# Patient Record
Sex: MALE | Race: WHITE | NOT HISPANIC OR LATINO | URBAN - METROPOLITAN AREA
[De-identification: names, ages, dates, MRNs, and addresses within clinical notes are randomized per-mention and may not be internally consistent; named-entity substitution may affect disease eponyms.]

---

## 2017-06-13 ENCOUNTER — OUTPATIENT (OUTPATIENT)
Dept: OUTPATIENT SERVICES | Facility: HOSPITAL | Age: 77
LOS: 1 days | Discharge: HOME | End: 2017-06-13

## 2017-06-28 DIAGNOSIS — C61 MALIGNANT NEOPLASM OF PROSTATE: ICD-10-CM

## 2017-07-18 ENCOUNTER — OUTPATIENT (OUTPATIENT)
Dept: OUTPATIENT SERVICES | Facility: HOSPITAL | Age: 77
LOS: 1 days | Discharge: HOME | End: 2017-07-18

## 2017-07-18 DIAGNOSIS — R05 COUGH: ICD-10-CM

## 2017-07-18 DIAGNOSIS — M25.9 JOINT DISORDER, UNSPECIFIED: ICD-10-CM

## 2017-08-29 ENCOUNTER — EMERGENCY (EMERGENCY)
Facility: HOSPITAL | Age: 77
LOS: 0 days | Discharge: HOME | End: 2017-08-29

## 2017-08-29 ENCOUNTER — OUTPATIENT (OUTPATIENT)
Dept: OUTPATIENT SERVICES | Facility: HOSPITAL | Age: 77
LOS: 1 days | Discharge: HOME | End: 2017-08-29

## 2017-08-29 DIAGNOSIS — Z90.89 ACQUIRED ABSENCE OF OTHER ORGANS: ICD-10-CM

## 2017-08-29 DIAGNOSIS — M79.89 OTHER SPECIFIED SOFT TISSUE DISORDERS: ICD-10-CM

## 2017-08-29 DIAGNOSIS — Z90.49 ACQUIRED ABSENCE OF OTHER SPECIFIED PARTS OF DIGESTIVE TRACT: ICD-10-CM

## 2017-08-29 DIAGNOSIS — I82.890 ACUTE EMBOLISM AND THROMBOSIS OF OTHER SPECIFIED VEINS: ICD-10-CM

## 2017-08-29 DIAGNOSIS — Z88.2 ALLERGY STATUS TO SULFONAMIDES: ICD-10-CM

## 2017-08-29 DIAGNOSIS — Z98.890 OTHER SPECIFIED POSTPROCEDURAL STATES: ICD-10-CM

## 2017-08-29 DIAGNOSIS — Z79.899 OTHER LONG TERM (CURRENT) DRUG THERAPY: ICD-10-CM

## 2017-08-29 DIAGNOSIS — I10 ESSENTIAL (PRIMARY) HYPERTENSION: ICD-10-CM

## 2017-08-29 DIAGNOSIS — I82.432 ACUTE EMBOLISM AND THROMBOSIS OF LEFT POPLITEAL VEIN: ICD-10-CM

## 2017-08-29 DIAGNOSIS — Z79.82 LONG TERM (CURRENT) USE OF ASPIRIN: ICD-10-CM

## 2017-08-29 DIAGNOSIS — Z88.0 ALLERGY STATUS TO PENICILLIN: ICD-10-CM

## 2017-09-06 DIAGNOSIS — M79.89 OTHER SPECIFIED SOFT TISSUE DISORDERS: ICD-10-CM

## 2017-09-06 DIAGNOSIS — Z02.9 ENCOUNTER FOR ADMINISTRATIVE EXAMINATIONS, UNSPECIFIED: ICD-10-CM

## 2017-09-13 ENCOUNTER — APPOINTMENT (OUTPATIENT)
Dept: VASCULAR SURGERY | Facility: CLINIC | Age: 77
End: 2017-09-13

## 2018-07-09 ENCOUNTER — APPOINTMENT (OUTPATIENT)
Dept: VASCULAR SURGERY | Facility: CLINIC | Age: 78
End: 2018-07-09
Payer: MEDICARE

## 2018-07-09 PROCEDURE — 93880 EXTRACRANIAL BILAT STUDY: CPT

## 2019-06-07 ENCOUNTER — RECORD ABSTRACTING (OUTPATIENT)
Age: 79
End: 2019-06-07

## 2019-06-07 DIAGNOSIS — Z87.891 PERSONAL HISTORY OF NICOTINE DEPENDENCE: ICD-10-CM

## 2019-06-07 DIAGNOSIS — M17.12 UNILATERAL PRIMARY OSTEOARTHRITIS, LEFT KNEE: ICD-10-CM

## 2019-06-07 DIAGNOSIS — I10 ESSENTIAL (PRIMARY) HYPERTENSION: ICD-10-CM

## 2019-06-07 DIAGNOSIS — H35.30 UNSPECIFIED MACULAR DEGENERATION: ICD-10-CM

## 2019-06-07 DIAGNOSIS — Z81.8 FAMILY HISTORY OF OTHER MENTAL AND BEHAVIORAL DISORDERS: ICD-10-CM

## 2019-07-10 ENCOUNTER — APPOINTMENT (OUTPATIENT)
Dept: NEUROLOGY | Facility: CLINIC | Age: 79
End: 2019-07-10
Payer: MEDICARE

## 2019-07-10 VITALS
DIASTOLIC BLOOD PRESSURE: 69 MMHG | SYSTOLIC BLOOD PRESSURE: 119 MMHG | OXYGEN SATURATION: 99 % | WEIGHT: 206 LBS | HEIGHT: 71 IN | TEMPERATURE: 95.2 F | HEART RATE: 72 BPM | BODY MASS INDEX: 28.84 KG/M2

## 2019-07-10 PROCEDURE — 99214 OFFICE O/P EST MOD 30 MIN: CPT

## 2019-07-10 NOTE — HISTORY OF PRESENT ILLNESS
[FreeTextEntry1] : Surendra is here for follow up. No new complaint. He is quite happy about that he is stands he just celebrated his 79th birthday.\par He continues to have morning stiffness and back pain and he continues to be slightly compromised mechanically when he walks. This is a combination of multiple factors including the ankle and knee and also spine.\par He did not have any fall he has his regular followup CTs primary physician. He has a borderline high cholesterol and also vitamin D deficiency.\par His sleep pattern is overall good he keeps himself busy bleeding and being active.\par He says he feels refreshed and he has limited social interaction but overall is happy.\par He says his memory is also good.

## 2019-07-10 NOTE — PHYSICAL EXAM
[FreeTextEntry1] : Surendra is in a good mood follows first step commands us as the midline well. His attention concentration and executive behavior is normal.\par Cranial nerve exam is normal does have slight problem with his hearing.\par Rapid eye movements and fine motor movements are normal there is no tremor there is noted a lesion he is able to stand up without using his hands although it is difficult for him because of his mechanics. He does have kyphosis and mild scoliosis

## 2019-07-10 NOTE — ASSESSMENT
[FreeTextEntry1] : Surendra is here for followup he did the history of gait disorder. I have seen him but the question of Parkinson disease that in my opinion was not there. His gait issue appears to be mechanical and multi- factorial\par At this point we will continue the current level of care I have discussed with him seeing his orthopedic surgeon for his right knee and also to continue being careful about the economy and hopefully losing a few pounds and hydrotherapy

## 2019-08-09 ENCOUNTER — APPOINTMENT (OUTPATIENT)
Dept: ORTHOPEDIC SURGERY | Facility: CLINIC | Age: 79
End: 2019-08-09
Payer: MEDICARE

## 2019-08-09 PROCEDURE — 99214 OFFICE O/P EST MOD 30 MIN: CPT

## 2019-08-09 PROCEDURE — 73564 X-RAY EXAM KNEE 4 OR MORE: CPT | Mod: 50

## 2019-08-09 NOTE — PHYSICAL EXAM
[de-identified] : Radiographs from today AP lateral and skyline views of both knees shows a well aligned cemented PS TKA on the left and advanced lateral compartment arthritis in the right knee.  [de-identified] : Left Knee\par Inspection: no effusion\par Wounds: healed midline incision, no drainage or erythema\par Alignment: normal.\par Palpation: no specific tenderness on palpation.\par ROM: 0-110 degrees, good stability\par Muscle Test: good quad strength.\par Leg examination: calf is soft and non-tender.\par \par Right knee\par Inspection: no effusion or erythema.\par Wounds: none.\par Alignment: normal.\par Palpation: lateral joint line tenderness\par ROM: 0-100 degrees\par Ligamentous laxity: all ligaments appear \par Meniscal Test: n/a\par Muscle Test: good quad strength.\par Leg examination: calf is soft and non-tender.

## 2019-08-09 NOTE — HISTORY OF PRESENT ILLNESS
[de-identified] : 79 year old male s/p left total knee replacement in August 2017 presents to the office today for a follow up. Pt reports doing very well in regard to his left knee replacement and has increased functionality in his activities of daily living. However, patient reports increased pain in his right arthritic knee. He reports a pain that is dull and occasionally sharp in nature. He denies any swelling, buckling,and loss of motion. He does report clicking and locking. Pt reports being able to ambulate about 3-4 blocks before his right knee pain limits him. He states that negotiating stairs can be difficult and painful, he uses a chair lift often. Pt denies taking anything for pain at this time. Overall, patient is happy with his left TKA, but would like to discuss his options regarding his right knee arthritis.

## 2019-08-09 NOTE — REVIEW OF SYSTEMS
[Negative] : Heme/Lymph [Joint Pain] : joint pain [Joint Stiffness] : joint stiffness [FreeTextEntry9] : Right knee

## 2019-08-09 NOTE — ASSESSMENT
[FreeTextEntry1] : S/p LTK, 2017, complicated by DVT. He is doing very well, he is off medications for DVT. Having no significant pain in the knee, occasional right knee pain which is arthritic. He is walking 3-4 blocks and uses a chair lift for stairs.

## 2020-01-14 ENCOUNTER — APPOINTMENT (OUTPATIENT)
Dept: NEUROLOGY | Facility: CLINIC | Age: 80
End: 2020-01-14
Payer: MEDICARE

## 2020-01-14 VITALS
TEMPERATURE: 98.6 F | SYSTOLIC BLOOD PRESSURE: 115 MMHG | HEART RATE: 79 BPM | WEIGHT: 200 LBS | OXYGEN SATURATION: 97 % | HEIGHT: 71 IN | BODY MASS INDEX: 28 KG/M2 | DIASTOLIC BLOOD PRESSURE: 78 MMHG

## 2020-01-14 PROCEDURE — 99213 OFFICE O/P EST LOW 20 MIN: CPT

## 2020-01-14 RX ORDER — DUTASTERIDE AND TAMSULOSIN HYDROCHLORIDE .5; .4 MG/1; MG/1
0.5-0.4 CAPSULE ORAL
Refills: 0 | Status: ACTIVE | COMMUNITY

## 2020-01-14 RX ORDER — LATANOPROSTENE BUNOD 0.24 MG/ML
0.02 SOLUTION/ DROPS OPHTHALMIC
Refills: 0 | Status: ACTIVE | COMMUNITY

## 2020-01-14 RX ORDER — AMLODIPINE BESYLATE AND BENAZEPRIL HYDROCHLORIDE 5; 40 MG/1; MG/1
5-40 CAPSULE ORAL
Refills: 0 | Status: ACTIVE | COMMUNITY

## 2020-01-14 RX ORDER — CARVEDILOL 6.25 MG/1
6.25 TABLET, FILM COATED ORAL TWICE DAILY
Refills: 0 | Status: ACTIVE | COMMUNITY

## 2020-01-14 RX ORDER — ROSUVASTATIN CALCIUM 5 MG/1
5 TABLET, FILM COATED ORAL DAILY
Refills: 0 | Status: ACTIVE | COMMUNITY

## 2020-01-14 RX ORDER — ASPIRIN 81 MG
81 TABLET, DELAYED RELEASE (ENTERIC COATED) ORAL DAILY
Refills: 0 | Status: ACTIVE | COMMUNITY

## 2020-01-14 RX ORDER — TIMOLOL MALEATE 5 MG/ML
0.5 SOLUTION/ DROPS OPHTHALMIC
Refills: 0 | Status: ACTIVE | COMMUNITY

## 2020-01-14 RX ORDER — NETARSUDIL 0.2 MG/ML
0.02 SOLUTION/ DROPS OPHTHALMIC; TOPICAL
Refills: 0 | Status: ACTIVE | COMMUNITY

## 2020-01-14 NOTE — HISTORY OF PRESENT ILLNESS
[FreeTextEntry1] : Surendra is here for followup. He has been doing very well. He did not have any episodes of fall. He does describe his motor activity at the baseline and good. He does not have any issues that he initiate some movements. His gait is stable he has no issues with dexterity and his swallowing is good. No lightheadedness. He was not told by anybody but he does have different facial expression. He feels that his mood and memory are good. He does like to read and he remembers things that he reads.\par His bladder and bowel function are normal he does have his regular followup CTs primary and also urology.\par He did have laser surgery of the right I don't recently. His vision is very good.\par He denies having significant aches and pains except for the right he. He saw Dr. Saab will do a complete excision for that reason.\par He does continue to have mainly the left hand tremor and it is only present when he is holding something in the left hand doing something with the left

## 2020-01-14 NOTE — PHYSICAL EXAM
[FreeTextEntry1] : Surendra is awake alert oriented x3 he follows 3-4 step commands. Shows good attention and concentration he is in a very good mood. His executive behaviors are slightly clumsy his memory is perfect he is spending on vocabulary is very good. His language is perfect.\par Cranial nerve exam is normal\par The motor exam shows slight valgus deformity of the ankles left more than right and also his posture is slightly school he'll take an kyphotic.\par His gait is stable\par Motor exam shows normal strength and also good dexterity and ability for fine motor movements. He is able to stand up without using his hands and his gait is stable\par Romberg is negative

## 2020-01-14 NOTE — ASSESSMENT
[FreeTextEntry1] : Surendra is here for followup with a history of gait disorder to cause is multifactorial and may need mechanical as a result of the knee and spine disease and also a slight joint deformity. He is doing very well the main concern in the past for him was whether he does have parkinsonian disease. The natural course and my exam is certainly not suggestive of that I believe his left-sided tremor is subcortical as is urological in nature. We will continue the current level of care and I will see him in followup in 12 months.

## 2020-07-22 ENCOUNTER — TRANSCRIPTION ENCOUNTER (OUTPATIENT)
Age: 80
End: 2020-07-22

## 2021-05-03 ENCOUNTER — APPOINTMENT (OUTPATIENT)
Dept: NEUROLOGY | Facility: CLINIC | Age: 81
End: 2021-05-03
Payer: MEDICARE

## 2021-05-03 VITALS
SYSTOLIC BLOOD PRESSURE: 122 MMHG | WEIGHT: 190 LBS | HEART RATE: 82 BPM | BODY MASS INDEX: 26.6 KG/M2 | DIASTOLIC BLOOD PRESSURE: 75 MMHG | HEIGHT: 71 IN | TEMPERATURE: 97.5 F | OXYGEN SATURATION: 98 %

## 2021-05-03 DIAGNOSIS — R25.1 TREMOR, UNSPECIFIED: ICD-10-CM

## 2021-05-03 DIAGNOSIS — M25.561 PAIN IN RIGHT KNEE: ICD-10-CM

## 2021-05-03 DIAGNOSIS — F32.9 MAJOR DEPRESSIVE DISORDER, SINGLE EPISODE, UNSPECIFIED: ICD-10-CM

## 2021-05-03 DIAGNOSIS — R26.89 OTHER ABNORMALITIES OF GAIT AND MOBILITY: ICD-10-CM

## 2021-05-03 PROCEDURE — 99213 OFFICE O/P EST LOW 20 MIN: CPT

## 2021-05-05 PROBLEM — R26.89 MULTIFACTORIAL GAIT DISORDER: Status: ACTIVE | Noted: 2019-06-07

## 2021-05-05 PROBLEM — F32.9 DEPRESSION: Status: ACTIVE | Noted: 2019-06-07

## 2021-05-05 PROBLEM — M25.561 RIGHT KNEE PAIN: Status: ACTIVE | Noted: 2019-08-09

## 2021-05-05 PROBLEM — R25.1 TREMOR: Status: ACTIVE | Noted: 2020-01-14

## 2021-05-05 NOTE — HISTORY OF PRESENT ILLNESS
[FreeTextEntry1] : Surendra is here for the F/u. last he was seen in the office in jan. 2020\par He lost some weight. He says he is doing Ok/well. Received his vaccines and saw his PMD and the blood test was good.\par He says his cholesterol and blood glucose and also moods are good and better.\par He sees his children occasionally , keeps himself busy . sleep pattern is good and goes out for walk. still having Right knee pain and considering replacement surgery.\par He is also having right shoulder pain. No neck pain.\par his blader function did not change still every 2-3 hours goes to the bathroom.\par He says his appetite and  memory iare good

## 2021-05-05 NOTE — ASSESSMENT
[FreeTextEntry1] : Gait D/O Multifactorial\par \par Mood disorder\par Right knee diseaseS/P left knee surgery

## 2021-05-05 NOTE — PHYSICAL EXAM
[FreeTextEntry1] : A/A/Ox3\par good mood\par good attention good executive behavior and normal language\par Cn- normal\par Slight atrophy of the right shoulder muscles\par has fukll ROM in all joints.\par slightly painful with abduction\par No drift\par No tremor\par no dysmetria\par stable gait\par scoliotic posture and valgus deformity of the right knee

## 2022-02-10 ENCOUNTER — NON-APPOINTMENT (OUTPATIENT)
Age: 82
End: 2022-02-10

## 2022-03-07 ENCOUNTER — NON-APPOINTMENT (OUTPATIENT)
Age: 82
End: 2022-03-07

## 2022-03-07 ENCOUNTER — APPOINTMENT (OUTPATIENT)
Dept: OPHTHALMOLOGY | Facility: CLINIC | Age: 82
End: 2022-03-07
Payer: MEDICARE

## 2022-03-07 PROCEDURE — 92025 CPTRIZED CORNEAL TOPOGRAPHY: CPT

## 2022-03-07 PROCEDURE — 92002 INTRM OPH EXAM NEW PATIENT: CPT

## 2022-03-07 PROCEDURE — 92286 ANT SGM IMG I&R SPECLR MIC: CPT

## 2022-03-17 ENCOUNTER — APPOINTMENT (OUTPATIENT)
Dept: NEUROLOGY | Facility: CLINIC | Age: 82
End: 2022-03-17

## 2022-08-08 ENCOUNTER — APPOINTMENT (OUTPATIENT)
Dept: ORTHOPEDIC SURGERY | Facility: CLINIC | Age: 82
End: 2022-08-08

## 2022-08-08 DIAGNOSIS — Z96.652 PRESENCE OF LEFT ARTIFICIAL KNEE JOINT: ICD-10-CM

## 2022-08-08 DIAGNOSIS — M17.11 UNILATERAL PRIMARY OSTEOARTHRITIS, RIGHT KNEE: ICD-10-CM

## 2022-08-08 PROCEDURE — 99214 OFFICE O/P EST MOD 30 MIN: CPT

## 2022-08-08 PROCEDURE — 73562 X-RAY EXAM OF KNEE 3: CPT | Mod: RT

## 2022-08-08 NOTE — HISTORY OF PRESENT ILLNESS
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 Pt reports doing very well in regard to his left knee replacement and has increased functionality in his activities of daily living. However, patient reports increased pain in his right arthritic knee. He reports a pain that is dull and occasionally sharp in nature. He denies any swelling, buckling,and loss of motion. He does report clicking and locking. Pt reports being able to ambulate about 3-4 blocks before his right knee pain limits him. He states that negotiating stairs can be difficult and painful, he uses a chair lift often. Pt denies taking anything for pain at this time. Overall, patient is happy with his left TKA, but would like to discuss his options regarding his right knee arthritis. \par \par 8/8/2022 - 82 year old male s/p left total knee replacement in August 2017 presents to the office today for a follow up. He reports doing well with his left total knee, but does continue to complain of pain in his arthritic right knee. Patient has pain with ambulation and is limited to only about 1-2 blocks of ambulation. He has difficulty with negotiating stairs and uses a chair lift to do so. Patient is not interested in a knee replacement at this time.

## 2022-08-08 NOTE — PHYSICAL EXAM
[de-identified] : Left Knee\par Inspection: no effusion\par Wounds: healed midline incision, no drainage or erythema\par Alignment: normal.\par Palpation: no specific tenderness on palpation.\par ROM: 0-110 degrees, good stability\par Muscle Test: good quad strength.\par Leg examination: calf is soft and non-tender.\par \par Right knee\par Inspection: no effusion or erythema.\par Wounds: none.\par Alignment: normal.\par Palpation: lateral joint line tenderness\par ROM: 0-100 degrees\par Ligamentous laxity: all ligaments appear \par Meniscal Test: n/a\par Muscle Test: good quad strength.\par Leg examination: calf is soft and non-tender. [de-identified] : Radiographs from today AP lateral and skyline views of both knees shows a well aligned cemented PS TKA on the left and advanced lateral compartment arthritis in the right knee.

## 2022-08-08 NOTE — ASSESSMENT
[FreeTextEntry1] : 8/9/19- S/p LTK, 2017, complicated by DVT. He is doing very well, he is off medications for DVT. Having no significant pain in the knee, occasional right knee pain which is arthritic. He is walking 3-4 blocks and uses a chair lift for stairs.\par \par 8/8/2022- S/p LTK 2017 presents to the office for annual visit. He is very satisfied with his LTK. He has severely arthritic right knee with complete loss of lateral joint space and valgus deformity. Given his age he's reluctant to do surgery and is severely hindered by his right knee. I renewed handicap parking sticker because of this. F/u in 2-3 years time.